# Patient Record
Sex: MALE | Race: WHITE
[De-identification: names, ages, dates, MRNs, and addresses within clinical notes are randomized per-mention and may not be internally consistent; named-entity substitution may affect disease eponyms.]

---

## 2018-11-30 NOTE — NUR
MET WITH PT AND WIFE. PT LIVES WITH WIFE. HE IS INDEPENDENT AND ACTIVE.  USES
[facility].  HAD HH IN PAST BUT CANNOT REMEMBER NAME OF AGENCY.  PT USED TO
SEE DR PRICE BUT NEEDS A NEW PCP, GAVE LIST.  PT DENIES DC NEEDS, WILL
FOLLOW

## 2018-11-30 NOTE — 2DMMODE
[place] [address]
Phone:  [phone] 2 D/M-MODE ECHOCARDIOGRAM     
_______________________________________________________________________________
 
Name:         AVELINOMARGOTHMATT              Room:          [place] [address]    ADM [place] 
[facility]#:    [id number]     Account #:     [id number]  
Admission:    18    Attend Phys:   Heydi Allen, 
Discharge:                Date of Birth: [birth year]  
Date of Service: 18 1744  Report #:      [id number]
        [id number]
_______________________________________________________________________________
THIS REPORT FOR:  //name//                      
 
 
--------------- APPROVED REPORT --------------
 
 
Study performed:  2018 16:59:13
 
EXAM: Comprehensive 2D, Doppler, and color-flow 
Echocardiogram 
Patient Location: In-Patient   
Room #:  [facility]     Status:  routine
 
      BSA:         2.04
HR: 83 bpm BP:          175/78 mmHg 
Rhythm: NSR     
 
Other Information 
Study Quality: Good
 
Indications
Atrial Fibrillation
Bradycardia
 
2D Dimensions
IVSd:  14.15 (7-11mm) LVOT Diam:  19.48 (18-24mm) 
LVDd:  46.21 mm  
PWd:  12.26 (7-11mm) Ascending Ao:  35.99 (22-36mm)
LVDs:  26.86 (25-40mm) 
Aortic Root:  34.14 mm 
 
Volumes
Left Atrial Volume (Systole) 
    LA ESV Index:  23.20 mL/m2
 
Aortic Valve
AoV Peak Bertin.:  2.18 m/s 
AO Peak Gr.:  19.04 mmHg LVOT Max P.24 mmHg
AO Mean Gr.:  10.18 mmHg LVOT Mean P.05 mmHg
    LVOT Max V:  1.03 m/s
AO V2 VTI:  38.14 cm  LVOT Mean V:  0.66 m/s
VERONICA (VTI):  1.28 cm2  LVOT V1 VTI:  16.38 cm
 
Mitral Valve
    E/A Ratio:  2.09
    MV Decel. Time:  258.99 ms
 
 
[place] [address]
Phone:  [phone]                     2 D/M-MODE ECHOCARDIOGRAM     
_______________________________________________________________________________
 
Name:         MATT MONTERROSO              Room:          [address] [place] 
[facility]#:    [id number]     Account #:     [id number]  
Admission:    18    Attend Phys:   Heydi Allen, 
Discharge:                Date of Birth: [birth year]  
Date of Service: 18 1744  Report #:      [id number]
        [id number]
_______________________________________________________________________________
MV E Max Bertin.:  1.21 m/s 
MV PHT:  75.11 ms  
MVA (PHT):  2.93 cm2  
 
TDI
E/Lateral E':  11.00 E/Medial E':  15.13
   Medial E' Bertin.:  0.08 m/s
   Lateral E' Bertin.:  0.11 m/s
 
Pulmonary Valve
PV Peak Bertin.:  0.94 m/s PV Peak Gr.:  3.50 mmHg
 
Left Ventricle
The left ventricle is normal size. There is normal LV segmental wall 
motion. Mild to moderate concentric left ventricular hypertrophy. 
Left ventricular systolic function is normal. LVEF is 60-65%. This 
study is not technically sufficient to allow evaluation of the LV 
diastolic function due to atrial fibrillation.
 
Right Ventricle
The right ventricle is normal size. The right ventricular systolic 
function is normal. Pacemaker lead is present in the right ventricle. 
 
Atria
The left atrium size is normal. The right atrium size is 
normal.
 
Aortic Valve
Mild aortic valve sclerosis. No aortic regurgitation is present. 
Moderate aortic stenosis.
 
Mitral Valve
The mitral valve is normal in structure. Trace mitral regurgitation. 
No evidence of mitral valve stenosis.
 
Tricuspid Valve
The tricuspid valve is normal in structure. Unable to assess PA 
pressure. Trace tricuspid regurgitation.
 
Pulmonic Valve
The pulmonary valve is normal in structure. There is no pulmonic 
valvular regurgitation.
 
Great Vessels
The aortic root is normal in size. IVC is normal in size and 
collapses &gt;50% with inspiration.
 
 
[place] [address]
Phone:  [phone]                     2 D/M-MODE ECHOCARDIOGRAM     
_______________________________________________________________________________
 
Name:         MATT MONTERROSO              Room:          [address] [place] 
[facility]#:    [id number]     Account #:     [id number]  
Admission:    18    Attend Phys:   Heydi Allen, 
Discharge:                Date of Birth: [birth year]  
Date of Service: 18 1744  Report #:      [id number]
        [id number]
_______________________________________________________________________________
 
Pericardium
There is no pericardial effusion.
 
&lt;Conclusion&gt;
The left ventricle is normal size.
Mild to moderate concentric left ventricular hypertrophy.
Left ventricular systolic function is normal.
LVEF is 60-65%.
This study is not technically sufficient to allow evaluation of the 
LV diastolic function due to atrial fibrillation.
Pacemaker lead is present in the right ventricle.
Mild aortic valve sclerosis.
Moderate aortic stenosis.
Trace mitral regurgitation.
IVC is normal in size and collapses &gt;50% with inspiration.
 
 
 
 
 
 
 
 
 
 
 
 
 
 
 
 
 
 
 
 
 
 
 
 
 
 
 
 
  <ELECTRONICALLY SIGNED>
                                           By: Dong Riggins MD, FACC   
  18
D: 18   _____________________________________
T: 18   Dong Riggins MD, FACC     /INF

## 2018-11-30 NOTE — NUR
RECIEVED REPORT AND ASSUMED CARE AT 1900. PULSE BRADYCARDIA, OTHER THAN THAT
VITAL SIGNS STABLE. PT UP WITH ASSIST X 1 TO 2 TO BATHROOM. ON RA. PT DID HAVE
A HEADACHE AND PRN MEDS GIVEN AS ORDERED. ASSESSMENT COMPLETED AND DISCUSSED
PLAN OF CARE WITH PT AND SPOUSE AND THEY UNDERSTAND. BED LOCK, ALARM ON AND
CALL LIGHT WTIHIN REACH. HOURLY ROUNDING DONE AND ALL NEEDS MET. NURSING WILL
CONTINUE TO MONITOR.

## 2018-11-30 NOTE — NUR
ASSESSMENT COMPLETED REFER TO COMPUTER CHARTING. CARDIAC MONITOR TRACKING SB.
PATIENT RESTING IN BED REPORTING NO PAIN, NAUSEA OR SHORTNESS OF BREATH. BED
IN LOW AND LOCKED POSITION. CALL LIGHT WITHIN REACH. WIFE AT BEDSIDE. PATIENT
TO HAVE PACEMAKER THIS AFTERNOON. WILL CONTINUE TO MONITOR THIS SHIFT.

## 2018-12-01 NOTE — NUR
RECEIVED REPORT FROM KRISHNA AND ASSUMED CARE OF PT @ 5763.PT IS A/O X4 BUT
CONFUSED @ TIMES.VSS,TRACING V PACED/AFIB WITH PVC @ TIMES.ASSESSMENT AS
CHARTED.IV PATENT AND SALINE LOCKED.LEFT ARM IN SLING FROM PACEMAKER PLACEMENT
YESTERDAY.INCISION SITE HAS SMALL AMOUNT OF DRAINAGE ON GAUZE DRESSING.PT IS
CALM AND COOPERATIVE WITH  NO C/O PAIN AT TIME OF ASSESSMENT.PT IS UP WITH ONE
ASSIST TO BRP.PT HAS BEEN UP IN CHAIR FOR MEALS.PT LEFT RESTING IN BED WITH
CALL LIGHT AND FALL PRECAUTIONS IN PLACE.WILL CONTINUE TO MONITOR.

## 2018-12-01 NOTE — CON
[address]  [address]                    CONSULTATION                  
_______________________________________________________________________________
 
Name:       MATT MONTERROSO               Room:           [address] [place]  
.R.#:  [id number]      Account #:      [id number]  
Admission:  11/29/18     Attend Phys:    Heydi Allen MD 
Discharge:               Date of Birth:  [birth year]  
         Report #: [id number]
                                                                     [id number]  
_______________________________________________________________________________
THIS REPORT FOR:  //name//                      
 
CC: [facility] physician/PCP
    Heydi Allen
 
TYPE OF REPORT:  Cardiology consultation. 
 
INDICATION:  Paroxysmal atrial fibrillation and sick sinus syndrome.
 
HISTORY OF PRESENT ILLNESS:  The patient is a 78-year-old gentleman with a prior
history of paroxysmal atrial fibrillation.  The patient presented to the
hospital with profound weakness and bradycardia.  He has hypertension that has
been difficult to control and has had some recent adjustments to medications. 
He has required multiple medications for his atrial fibrillation.  Ablation has
been discussed and deferred in the past.  He is not having chest pain.  Holding
some of his medications, his bradycardia has improved.  He continues to have
hypertension.
 
PAST MEDICAL HISTORY:
1.  Paroxysmal atrial fibrillation.
2.  Sick sinus syndrome.
 
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
extremity.
 
FAMILY HISTORY:  Noncontributory.
 
SOCIAL HISTORY:  Lifelong nonsmoker.
 
ALLERGIES:  PENICILLIN.
 
CURRENT MEDICATIONS:  Flomax 0.4 mg p.o. daily, clonidine 0.1 mg p.o. b.i.d.,
diltiazem 300 mg p.o. daily, sotalol 240 mg b.i.d., Zocor 10 mg daily, Benicar
40 mg daily, Eliquis 5 mg b.i.d., digoxin 0.125 mg daily, vitamin B12 1000 mcg
daily, Aller-Chlor 4 mg daily, vitamin C 500 mg daily and Tylenol 500 mg q. 4
hours p.r.n.
 
REVIEW OF SYSTEMS:  
CONSTITUTIONAL:  He denies generalized weakness or focal paralysis.  In general,
there is no unexplained weight loss or fever.
RESPIRATORY:  Without cough, sputum production or underlying lung disease.
CARDIAC:  As outlined above.
ENDOCRINE:  No history of diabetes or thyroid disease.
GASTROINTESTINAL:  No nausea, vomiting, hematemesis, melena, hematochezia,
jaundice or hepatitis.
GENITOURINARY:  No dysuria or hematuria.
 
 
 
[place] [address]                    CONSULTATION                  
_______________________________________________________________________________
 
Name:       MATT MONTERROSO               Room:           [address] [place]  
[facility]#:  [id number]      Account #:      [id number]  
Admission:  11/29/18     Attend Phys:    Heydi Allen MD 
Discharge:               Date of Birth:  [birth year]  
         Report #: [id number]
                                                                     [id number]  
_______________________________________________________________________________
HEMATOLOGIC AND LYMPHATIC:  No history of anemia or bleeding disorder.  He does
have a history of previous cancer removed from the right lower extremity.
PSYCHIATRIC:  No depression or anxiety.
MUSCULOSKELETAL:  He has some arthritis without connective tissue disease.
SKIN:  No recent rashes or chronic skin conditions.
EYES:  He does wear glasses.  He has no acute change in vision.
EARS, NOSE, MOUTH AND THROAT:  He denies decreased hearing or epistaxis.
ALLERGY AND IMMUNOLOGIC:  He has medical allergies as outlined above.
 
PHYSICAL EXAMINATION:
VITAL SIGNS:  Stable.  Blood pressure 161/67, pulse presently in the 50s.  
GENERAL:  This is a pleasant gentleman, in no distress.  Mood and affect
appropriate.
HEENT:  Extraocular muscles intact.  Mucous membranes are moist.
NECK:  Shows no jugular venous distention.  There are no carotid bruits.
CHEST:  Reveals clear lung fields without wheezes, rales or rhonchi.
CARDIOVASCULAR:  Reveals a bradycardic rate with a regular rhythm.  I do not
appreciate gallop or murmur.
ABDOMEN:  Reveals normal bowel sounds.  The abdomen is soft and nontender.
EXTREMITIES:  Shows no edema.  Peripheral pulse 2+ and easily palpable.
SKIN:  Warm and dry.
 
RADIOLOGICAL DATA:  A 12-lead EKG shows sinus bradycardia with paroxysmal atrial
fibrillation.  Echocardiogram pending.
 
IMPRESSION AND RECOMMENDATIONS:
1.  Sick sinus syndrome and paroxysmal atrial fibrillation.  The patient will
benefit from dual-chamber pacemaker in order to facilitate better rhythm control
and medical management of his atrial fibrillation.
2.  Atrial fibrillation.  Continue chronic anticoagulation with Eliquis.
3.  Hypertension.  We will adjust antihypertensive regimen after pacemaker
placed.
 
 
 
 
 
 
 
 
 
 
 
 
<ELECTRONICALLY SIGNED>
                                        By:  Dong Riggins MD, FACC   
12/01/18     0944
D: 11/30/18 1233_______________________________________
T: 11/30/18 2302Mictanya Riggins MD, FACC      /nt

## 2018-12-01 NOTE — NUR
RECIEVED REPORT AND ASSUMED CARE AT 1900. TEMP SLIGHTLY ELEVATED, OTHER THAN
THAT VITAL SIGNS STABLE. PT UP WITH ASSIST X 2 BUT UNSTABLE SO JUST USED
URINAL IN BED. PT HAS PAIN IN LEFT ARM AND PRN PAIN MEDS GIVEN AS ORDERED.
ASSESSMENT COMPLETED AND DISCUSSED PLAN OF CARE AND PT AND WIFE UNDERSTANDS.
PT ON RA. BED LOCKED, BED ALARM ON AND CALL LIGHT WITHIN REACH. HOURLY
ROUNDING DONE AND ALL NEEDS MET. NURSING WILL CONTINUE TO MONITOR.

## 2018-12-01 NOTE — NUR
VSS,CARDIAC MONITORING IN PLACE TRACING AT TIMES V PACED/AV PACED/AFIB WITH
PVCS.PT REMAINS ON ROOM AIR.NO C/O PAIN.IV X2 PATENT AND SALINE LOCKED.PT
PROGRESSING TOWARDS GOALS.PT WORKED WITH PHYSICAL THERAPY.PT INFORMED OF PLAN
OF CARE AND COMMUNICATES UNDERSTANDING.HOURLY ROUNDING COMPLETED FOR PT
SAFETY.CALL LIGHT AND FALL PRECAUTIONS IN PLACE.WILL CONTINUE TO MONITOR FOR
DURATION OF SHIFT.

## 2018-12-02 NOTE — EKG
[place] [address]
Phone:  [phone]                     ELECTROCARDIOGRAM REPORT      
_______________________________________________________________________________
 
Name:       LOCMATT               Room:           [place] [address]    ADM [place]  
.R.#:  [id number]      Account #:      [id number]  
Admission:  18     Attend Phys:    Heydi Allen MD 
Discharge:               Date of Birth:  [birth year]  
         Report #: [id number]
    [id number]
_______________________________________________________________________________
THIS REPORT FOR:  //name//                      
 
                          [facility]
                                       
Test Date:    2018               Test Time:    08:01:28
Pat Name:     MATT MONTERROSO         Department:   
Patient ID:   [id number]            Room:         [place] [address]
Gender:       M                        Technician:   KATELIN
:          1940               Requested By: Dong Riggins
Order Number: [id number]    Pat MD:   Dong Riggins
                                 Measurements
Intervals                              Axis          
Rate:         78                       P:            0
TX:           67                       QRS:          -58
QRSD:         137                      T:            -22
QT:           409                                    
QTc:          466                                    
                           Interpretive Statements
Ventricular-paced complexes
No further rhythm analysis attempted due to paced rhythm
RBBB and LAFB
No previous ECG available for comparison
 
Electronically Signed On 2018 15:34:32 CST by Dong Riggins
[email or web address]
 
 
 
 
 
 
 
 
 
 
 
 
 
 
 
 
 
 
  <ELECTRONICALLY SIGNED>
                                           By: Dong Riggins MD, [facility]   
  18     1534
D: 18 0801   _____________________________________
T: 18 0801   Dong Riggins MD, [facility]     /EPI

## 2018-12-02 NOTE — NUR
RECEIVED REPORT FROM KRISHNA AND ASSUMED CARE OF PT @ 5846.PT IS A/O
X4,VSS,TRACING SR WITH BBB ON THE MONITOR.ASSESSMENT AS CHARTED.IV X2 PATNET
AND SALINE LOCKED.PT CALM AND COOPERATIVE WITH NO C/O PAIN AT TIME OF
ASSESSMENT.PT IS UP WITH ONE ASSIST TO BATHROOM.PT LEFT RESTING IN RECLINER
WITH WIFE AT BEDSIDE.CALL LIGHT AND FALL PRECAUTIONS IN PLACE.WILL CONTINUE TO
MONITOR.

## 2018-12-02 NOTE — NUR
VSS,CARDIAC MONITORING IN PLACE WITH NO CHANGES.PT REMAINS ON ROOM AIR.PT
PROGRESSING TOWARDS GOALS.NO C/O PAIN.IV PATENT AND SALINE LOCKED.PT INFORMED
OF PLAN OF CARE AND COMMUNICATES UNDERSTANDING.HOURLY ROUNDING COMPLETED FOR
PT SAFETY.CALL LIGHT AND FALL PRECAUTIONS IN PLACE. WILL CONTINUE TO MONITOR
FOR DURATION OF SHIFT.

## 2018-12-02 NOTE — EKG
[place] [address]
Phone:  [phone]                     ELECTROCARDIOGRAM REPORT      
_______________________________________________________________________________
 
Name:       AVELINOMATT JUSTIN               Room:           [place] [address]    ADM [place]  
[facility].#:  [id number]      Account #:      [id number]  
Admission:  18     Attend Phys:    Heydi Allen MD 
Discharge:               Date of Birth:  [birth year]  
         Report #: [id number]
    [id number]
_______________________________________________________________________________
THIS REPORT FOR:  //name//                      
 
                         [facility] ED
                                       
Test Date:    2018               Test Time:    15:54:51
Pat Name:     MATT MONTERROSO         Department:   
Patient ID:   [id number]            Room:         [facility]
Gender:       M                        Technician:   JUDY MATHEW
:          1940               Requested By: Maria Esther Ballard
Order Number: [id number] MD:   Dong Riggins
                                 Measurements
Intervals                              Axis          
Rate:         40                       P:            -24
[place]:           180                      QRS:          92
QRSD:         145                      T:            -60
QT:           600                                    
QTc:          490                                    
                           Interpretive Statements
Sinus bradycardia
RBBB and LPFB
Repol abnrm suggests ischemia, diffuse leads
No previous ECG available for comparison
 
Electronically Signed On 2018 15:26:31 CST by Dong Riggins
[email or web address]
 
 
 
 
 
 
 
 
 
 
 
 
 
 
 
 
 
 
  <ELECTRONICALLY SIGNED>
                                           By: Dong Riggins MD, [facility]   
  18     1526
D: 18 1554   _____________________________________
T: 18 1554   Dong Riggins MD, [facility]     /EPI

## 2018-12-02 NOTE — NUR
RECIEVED REPORT AND ASSUMED CARE AT 1900. VITAL SIGNS STABLE. PT UP WITH
ASSIST X 1 TO 2. ASSESSMENT COMPLETED AND DISCUSSED PLAN OF CARE AND PT AND
WIFE UNDERSTANDS. PT ON RA. PT HAS SOME PAIN IN LEFT ARM AND HEADACHE, PRN
PAIN MEDS GIVEN AS ORDERED. BED LOCKED, ALARM ON AND CALL LIGHT WITHIN REACH.
HOURLY ROUNDING DONE AND ALL NEEDS MET. NURSING WILL CONTINUE TO MONITOR.

## 2018-12-03 NOTE — NUR
PT ALERT ORIETNED. UP WITH ASSIST OF ONE. L CHEST WITH STERI STRIPS. AREA
PUFFY. TELEMETERY SHOWS SR BBB 1ST DEGEE AVB. DENIES PAIN. PT REQUEST STOOL
SOFTENER. DR ORDERED MAG CITRATE LAST NOC AND MIRALAX IN THE AM. PT DECLINED
TAKING THE MAG CITRATE BECAUSE HE WANTED TO GET GOOD NIGHTS SLEEP. PT AGREED
TO TAKE MIRALAX IN AM.

## 2018-12-03 NOTE — NUR
CONTINUE TO FOLLOW, MET WITH PT AND WIFE. PT NOW STATING HE PREFERS TO GO
HOME, STATES HE DOESN'T WANT TO STAY IN THE HOSPITAL ANY LONGER AND PREFERS TO
DO OUTPT [place].  OFFERED HH AND HE DECLINES THAT ALSO.  PT HAD CONSULT FOR INPT
REHAB, BUT STATES HE DIDN'T UNDERSTAND THAT WAS 'INPT'.  PT AGREEABLE TO HAVE
CM ASSIST WITH MAKING NEW PCP APPT, DISCUSSED OPTIONS FOR PCP AGAIN AND HE
CHOSE DR BOYD.  APPT MADE FOR MONDAY 12/10, PT ALSO HAS APPT FOR SITE CHECK
WITH NURSE AT [facility] OFFICE, TIME ADJUSTED SO HE CAN SEE DR BOYD FIRST.  WIFE
GIVEN CARDS FOR [facility] APPT F/U AND FORM TO FILL OUT.  CALLED AND FAXED CLINICAL
TO DR BOYD FOR F/U.  DISCUSSED WTIH DR CAMPOS THAT PT WANTS TO GO TO OUTPT
THERAPY.  NO OTHER NEEDS ID'D

## 2018-12-04 NOTE — CARD
[address]  [address]                    CARDIAC CATH REPORT           
_______________________________________________________________________________
 
Name:       MATT MONTERROSO               Room:           [address] [place]  
[facility]#:  [id number]      Account #:      [id number]  
Admission:  11/29/18     Attend Phys:    Heydi Allen MD 
Discharge:  12/03/18     Date of Birth:  [birth year]  
         Report #: [id number]
                                                                     [id number]
_______________________________________________________________________________
THIS REPORT FOR:  //name//                      
 
 
--------------- ADDENDUM APPROVED REPORT --------------
 
 
Study performed:  11/30/2018 14:46:35
 
Patient Status: In-Patient       Room #: 233
Event Personnel: Dong Riggins Cardiologist, Dona Luo RN 
Circulator, Tierra Barr RN RN, Rafia Squires RTR Monitor, 
Vonda Medina RCIS Scrub
Exam: Insertion of Dual Chamber Permanent Pacemaker
 
The patient is a 78 year-old male with a history of .
 
Conscious Sedation
Start time:  15:58           End Time:  16:25    
Fentanyl  100 mcg    Versed  2 mg  
 
Implanted Devices:  Biotronik Eluna 8 DR-T, serial 
#[id number].
Biotronik solely S 60, serial number [id number] ventricular 
lead.
Biotronik solely S 53, serial #[id number] atrial lead.
 
Procedure
The patient underwent informed consent. We discussed the details of 
the procedure including the risks, which include, but not limited to 
bleeding, infection, vascular damage, cardiac perforation, and 
pneumothorax. 
After informed consent was obtained the area of the left chest was 
prepped and draped in sterile fashion. Local anesthesia was achieved 
with 1% lidocaine. Next after an initial incision was made a device 
pocket was formed over the left pectoralis muscle using 
electrocautery and blunt dissection. The left subclavian vein was 
then accessed with a micropuncture kit utilizing a peripheral 
injection of IV contrast. Ultimately a safety J guidewire was 
advanced to the level of the right atrium under fluoroscopic 
guidance. The guidewire was fixed external using a Melonie forcep. The 
micropuncture kit was utilized a second time to access the left 
subclavian vein. A second safety a guidewire was advanced to the area 
of the right atrium under fluoroscopic guidance. A 7 Japanese tear-away 
introducer was advanced over the free guidewire. The dilator and 
guidewire were removed as a ventricular lead was advanced to a secure 
position within the right ventricular apex under fluoroscopic 
 
 
 
[place] [address]                    CARDIAC CATH REPORT           
_______________________________________________________________________________
 
Name:       MATT MONTERROSO               Room:           M.233-1    DIS IN  
M.R.#:  [id number]      Account #:      [id number]  
Admission:  11/29/18     Attend Phys:    Heydi Allen MD 
Discharge:  12/03/18     Date of Birth:  [birth year]  
         Report #: [id number]
                                                                     [id number]
_______________________________________________________________________________
guidance. The tear-away introducer was removed. The lead was actively 
fixed. Thresholds were checked and deemed to be satisfactory. There 
was no diaphragmatic stimulation with maximum output pacing. A 7 
Japanese tear-away introducer was advanced over the remaining 
guidewire. The dilator and guidewire were removed and an atrial lead 
advanced to a secure position within the right atrial appendage. The 
tear-away introducer was removed. The lead was actively fixed. 
Thresholds were checked and deemed to be satisfactory. There was no 
phrenic nerve stimulation with maximum output stimulation. After 
adequate slack was assured and the atrial and ventricular leads the 
leads were secured within the device pocket using the designated cuff 
and interrupted stitches of 0 silk suture. The pocket was then 
flushed with antibody solution. A dual-chamber pulse generator was 
attached to the atrial and ventricular lead. The generator and 
redundant lead were then placed within the device pocket. The deep 
tissues were closed utilizing interrupted stitches of 2-0 Vicryl. The 
skin incision was then closed with a single subcuticular stitch of 
4-0 Vicryl. Several Steri-Strips were placed across the incision. A 
sterile Telfa dressing was then covered with a Tegaderm. The patient 
tolerated the procedure well without complication.
 
Electrode Parameters
P Wave:  2.8 mV     R Wave: 14.1 mV     
Atrial Threshold:  atrial fibrillation  Ventricular Threshold:  0.9 V 
at 0.40 ms 
Atrial Resistance:  464    Ventricular Resistance:  890 ohms  
Mode: DDD/CLS.
Lower rate 60 bpm.
Upper tracking rate 130 bpm.       
 
Complications
The patient tolerated the procedure well and there were no 
complications associated with the procedure. 
 
Findings
1. Symptomatic bradycardia.
2. Successful placement of a dual-chamber pacemaker.
 
Conclusion
1. Follow up in 1 week for a site check.
2. Follow-up device check in the office in one to 2 months.
 
 
 
<ELECTRONICALLY SIGNED>
                                        By:  Dong Riggins MD, FACC   
12/04/18     1709
D: 12/04/18 1709_______________________________________
T: 12/04/18 1709Mictanya Riggins MD, FACC      /INF

## 2019-01-07 NOTE — EKG
[place] [address]
Phone:  [phone]                     ELECTROCARDIOGRAM REPORT      
_______________________________________________________________________________
 
Name:       BARBARAMERARYMATT JUSTIN               Room:                      [facility]#:  [id number]      Account #:      [id number]  
Admission:  19     Attend Phys:                         
Discharge:  19     Date of Birth:  [birth year]  
         Report #: [id number]
    [id number]
_______________________________________________________________________________
THIS REPORT FOR:  //name//                      
 
                         [facility] ED
                                       
Test Date:    2019               Test Time:    10:43:26
Pat Name:     MATT MONTERROSO         Department:   
Patient ID:   [id number]            Room:          
Gender:       M                        Technician:   SORAIDA
:          1940               Requested By: Elana Le
Order Number: [id number] MD:   Thiago Lazo
                                 Measurements
Intervals                              Axis          
Rate:         66                       P:            
DE:           133                      QRS:          -62
QRSD:         141                      T:            -30
QT:           520                                    
QTc:          545                                    
                           Interpretive Statements
Atrial-paced rhythm
RBBB and LAFB
Compared to ECG 2018 08:01:28
No significant changes
 
Electronically Signed On 2019 16:32:44 CST by Thiago Lazo
[email or web address]
 
 
 
 
 
 
 
 
 
 
 
 
 
 
 
 
 
 
  <ELECTRONICALLY SIGNED>
                                           By: Thiago Lazo MD, [facility]     
  19     1632
D: 19 1043   _____________________________________
T: 19   Thiago Lazo MD, FAC       /EPI

## 2019-02-05 NOTE — NUR
PATIENT WAS SEEN THIS MORNING FOR MYELOGRAM.  CALLED TO RADIOLOGY TO PERFORM
VITAL SIGNS PRIOR TO PROCEDURE.  PATIENT HAD NOT RECEIVED HIS BLOOD PRESSURE
MEDICATION THIS MORNING AS NO INSTRUCTIONS HAD BEEN GIVEN TO HIM SO HE CHOSE
NOT TO TAKE IT.  PATIENT BP /136.  DR. RODRIGUEZ NOTIFIED AND PATIENT
RECEIVED 0.1MG CLONIDINE  SOTALOL.  THESE WERE HIS REGULAR MEDICATIONS
AS ORDERED BY HIS PHYSICIAN AT HOME PER HIS MEDICATION LIST. PATIENT STATED
HIS CARDIOLOGIST WAS DR. BUSTOS WHO IS ON VACATION THIS WEEK.  INFORMED DR. KUMAR AND DR. RUIZ OF PATIENT AND HIS VITAL SIGNS AND INTERVENTION DONE
FOR FUTURE REFERENCE IF NEEDED.
.

## 2019-10-09 ENCOUNTER — HOSPITAL ENCOUNTER (OUTPATIENT)
Dept: HOSPITAL 96 - M.CRD | Age: 79
End: 2019-10-09
Attending: NURSE PRACTITIONER
Payer: COMMERCIAL

## 2019-10-09 DIAGNOSIS — I50.42: ICD-10-CM

## 2019-10-09 DIAGNOSIS — I07.1: Primary | ICD-10-CM

## 2019-10-09 DIAGNOSIS — R60.0: ICD-10-CM

## 2019-10-09 DIAGNOSIS — Z95.0: ICD-10-CM

## 2019-10-09 NOTE — 2DMMODE
[place] [address]
Phone:  [phone] 2 D/M-MODE ECHOCARDIOGRAM     
_______________________________________________________________________________
 
Name:         MATT MONTERROSO          Room:                     REG CLI
M.R.#:    [id number]     Account #:     [id number]  
Admission:    10/09/19    Attend Phys:   Marie Paul 
Discharge:                Date of Birth: [birth year]  
Date of Service: 10/09/19 1437  Report #:      [id number]
        [id number]
_______________________________________________________________________________
THIS REPORT FOR:  //name//                      
 
 
--------------- APPROVED REPORT --------------
 
 
Study performed:  10/09/2019 13:35:54
 
EXAM: Comprehensive 2D, Doppler, and color-flow 
Echocardiogram 
Patient Location: Out-Patient   
 
      BSA:         2.07
HR: 85 bpm BP:          130/70 mmHg 
 
Other Information 
Study Quality: Good
 
Indications
Congestive Heart Failure
 
2D Dimensions
IVSd:  12.43 (7-11mm) LVOT Diam:  20.26 (18-24mm) 
LVDd:  51.99 mm  
PWd:  9.68 (7-11mm) Ascending Ao:  33.28 (22-36mm)
LVDs:  37.78 (25-40mm) 
Aortic Root:  28.54 mm 
 
Volumes
Left Atrial Volume (Systole) 
    LA ESV Index:  22.10 mL/m2
 
Aortic Valve
AoV Peak Bertin.:  2.13 m/s 
AO Peak Gr.:  18.09 mmHg LVOT Max PG:  3.13 mmHg
AO Mean Gr.:  10.76 mmHg LVOT Mean P.59 mmHg
    LVOT Max V:  0.88 m/s
AO V2 VTI:  48.26 cm  LVOT Mean V:  0.58 m/s
VERONICA (VTI):  1.27 cm2  LVOT V1 VTI:  18.96 cm
 
Mitral Valve
    E/A Ratio:  0.83
    MV Decel. Time:  182.33 ms
MV E Max Bertin.:  1.06 m/s 
MV PHT:  52.88 ms  
MVA (PHT):  4.16 cm2  
 
 
[place] [address]
Phone:  [phone]                     2 D/M-MODE ECHOCARDIOGRAM     
_______________________________________________________________________________
 
Name:         MATT MONTERROSO          Room:                     REG CLI
M.R.#:    [id number]     Account #:     [id number]  
Admission:    10/09/19    Attend Phys:   Marie Paul 
Discharge:                Date of Birth: [birth year]  
Date of Service: 10/09/19 1437  Report #:      [id number]
        [id number]
_______________________________________________________________________________
 
TDI
E/Lateral E':  13.25 E/Medial E':  13.25
   Medial E' Bertin.:  0.08 m/s
   Lateral E' Bertin.:  0.08 m/s
 
Pulmonary Valve
PV Peak Bertin.:  0.88 m/s PV Peak Gr.:  3.08 mmHg
 
Tricuspid Valve
    RAP Estimate:  5.00 mmHg
TR Peak Gr.:  18.72 mmHg RVSP:  23.72 mmHg
    PA Pressure:  23.72 mmHg
 
Left Ventricle
The left ventricle is normal size. There is normal LV segmental wall 
motion. There is normal left ventricular wall thickness. Left 
ventricular systolic function is normal. The left ventricular 
ejection fraction is within the normal range. LVEF is 55%. Grade I - 
abnormal relaxation pattern.
 
Right Ventricle
The right ventricle is normal size. The right ventricular systolic 
function is normal.
 
Atria
The left atrium size is normal. Pacemaker lead is present in the 
right atrium.
 
Aortic Valve
Mild aortic valve sclerosis. No aortic regurgitation is present. No 
hemodynamically significant valvular aortic stenosis.
 
Mitral Valve
The mitral valve is normal in structure. There is no mitral valve 
regurgitation noted. No evidence of mitral valve stenosis.
 
Tricuspid Valve
The tricuspid valve is normal in structure. Mild tricuspid 
regurgitation.
 
Pulmonic Valve
The pulmonary valve is normal in structure. There is no pulmonic 
valvular regurgitation.
 
[facility]
 
 
[place] [address]
Phone:  [phone] 2 D/M-MODE ECHOCARDIOGRAM     
_______________________________________________________________________________
 
Name:         LOCMATTNAUN RODRIGUEZ          Room:                     REG CLI
M.R.#:    [id number]     Account #:     [id number]  
Admission:    10/09/19    Attend Phys:   Marie Paul 
Discharge:                Date of Birth: [birth year]  
Date of Service: 10/09/19 1437  Report #:      [id number]
        [id number]
_______________________________________________________________________________
The aortic root is normal in size. IVC is normal in size and 
collapses >50% with inspiration.
 
Pericardium
There is no pericardial effusion.
 
<Conclusion>
The left ventricle is normal size.
There is normal left ventricular wall thickness.
Left ventricular systolic function is normal.
The left ventricular ejection fraction is within the normal 
range.
LVEF is 55%.
Grade I - abnormal relaxation pattern.
The right ventricle is normal size.
The left atrium size is normal.
Mild aortic valve sclerosis.
No aortic regurgitation is present.
No hemodynamically significant valvular aortic stenosis.
The mitral valve is normal in structure.
The tricuspid valve is normal in structure.
IVC is normal in size and collapses >50% with inspiration.
There is no pericardial effusion.
There is normal LV segmental wall motion.
 
 
 
 
 
 
 
 
 
 
 
 
 
 
 
 
 
 
 
 
  <ELECTRONICALLY SIGNED>
                                           By: Thiago Lazo MD, FACC     
  10/09/19     1437
D: 10/09/19 1437   _____________________________________
T: 10/09/19 1437   Thiago Lazo MD, FACC       /INF

## 2020-01-29 ENCOUNTER — HOSPITAL ENCOUNTER (OUTPATIENT)
Dept: HOSPITAL 96 - M.RAD | Age: 80
End: 2020-01-29
Attending: NURSE PRACTITIONER
Payer: COMMERCIAL

## 2020-01-29 DIAGNOSIS — Z79.899: Primary | ICD-10-CM

## 2020-02-01 ENCOUNTER — HOSPITAL ENCOUNTER (INPATIENT)
Dept: HOSPITAL 96 - M.ERS | Age: 80
LOS: 3 days | Discharge: HOME | DRG: 246 | End: 2020-02-04
Attending: FAMILY MEDICINE | Admitting: FAMILY MEDICINE
Payer: MEDICARE

## 2020-02-01 VITALS — DIASTOLIC BLOOD PRESSURE: 58 MMHG | SYSTOLIC BLOOD PRESSURE: 134 MMHG

## 2020-02-01 VITALS — SYSTOLIC BLOOD PRESSURE: 136 MMHG | DIASTOLIC BLOOD PRESSURE: 64 MMHG

## 2020-02-01 VITALS — DIASTOLIC BLOOD PRESSURE: 71 MMHG | SYSTOLIC BLOOD PRESSURE: 141 MMHG

## 2020-02-01 VITALS — WEIGHT: 224 LBS | HEIGHT: 69.02 IN | BODY MASS INDEX: 33.18 KG/M2

## 2020-02-01 DIAGNOSIS — E87.6: ICD-10-CM

## 2020-02-01 DIAGNOSIS — N18.3: ICD-10-CM

## 2020-02-01 DIAGNOSIS — I21.4: Primary | ICD-10-CM

## 2020-02-01 DIAGNOSIS — D68.59: ICD-10-CM

## 2020-02-01 DIAGNOSIS — I48.0: ICD-10-CM

## 2020-02-01 DIAGNOSIS — J44.9: ICD-10-CM

## 2020-02-01 DIAGNOSIS — I12.9: ICD-10-CM

## 2020-02-01 DIAGNOSIS — N17.0: ICD-10-CM

## 2020-02-01 DIAGNOSIS — I42.8: ICD-10-CM

## 2020-02-01 DIAGNOSIS — Z98.2: ICD-10-CM

## 2020-02-01 DIAGNOSIS — Z88.0: ICD-10-CM

## 2020-02-01 DIAGNOSIS — Z79.82: ICD-10-CM

## 2020-02-01 DIAGNOSIS — Z79.899: ICD-10-CM

## 2020-02-01 DIAGNOSIS — Z79.01: ICD-10-CM

## 2020-02-01 DIAGNOSIS — Z85.89: ICD-10-CM

## 2020-02-01 DIAGNOSIS — Z95.0: ICD-10-CM

## 2020-02-01 DIAGNOSIS — R56.9: ICD-10-CM

## 2020-02-01 LAB
ABSOLUTE BASOPHILS: 0.1 THOU/UL (ref 0–0.2)
ABSOLUTE EOSINOPHILS: 0.4 THOU/UL (ref 0–0.7)
ABSOLUTE MONOCYTES: 0.6 THOU/UL (ref 0–1.2)
ALBUMIN SERPL-MCNC: 3.5 G/DL (ref 3.4–5)
ALP SERPL-CCNC: 78 U/L (ref 46–116)
ALT SERPL-CCNC: 37 U/L (ref 30–65)
ANION GAP SERPL CALC-SCNC: 23 MMOL/L (ref 7–16)
AST SERPL-CCNC: 22 U/L (ref 15–37)
BASOPHILS NFR BLD AUTO: 0.7 %
BILIRUB SERPL-MCNC: 0.4 MG/DL
BUN SERPL-MCNC: 9 MG/DL (ref 7–18)
CALCIUM SERPL-MCNC: 8.6 MG/DL (ref 8.5–10.1)
CHLORIDE SERPL-SCNC: 98 MMOL/L (ref 98–107)
CHOLEST SERPL-MCNC: 131 MG/DL (ref ?–200)
CO2 SERPL-SCNC: 17 MMOL/L (ref 21–32)
CREAT SERPL-MCNC: 1.7 MG/DL (ref 0.6–1.3)
EOSINOPHIL NFR BLD: 4.4 %
GLUCOSE SERPL-MCNC: 172 MG/DL (ref 70–99)
GRANULOCYTES NFR BLD MANUAL: 62.7 %
HCT VFR BLD CALC: 40.7 % (ref 42–52)
HDLC SERPL-MCNC: 33 MG/DL (ref 40–?)
HGB BLD-MCNC: 14.1 GM/DL (ref 14–18)
LDLC SERPL-MCNC: 83 MG/DL (ref ?–100)
LYMPHOCYTES # BLD: 2.1 THOU/UL (ref 0.8–5.3)
LYMPHOCYTES NFR BLD AUTO: 25.2 %
MAGNESIUM SERPL-MCNC: 1.7 MG/DL (ref 1.8–2.4)
MCH RBC QN AUTO: 32.1 PG (ref 26–34)
MCHC RBC AUTO-ENTMCNC: 34.6 G/DL (ref 28–37)
MCV RBC: 92.7 FL (ref 80–100)
MONOCYTES NFR BLD: 7 %
MPV: 10 FL. (ref 7.2–11.1)
NEUTROPHILS # BLD: 5.1 THOU/UL (ref 1.6–8.1)
NUCLEATED RBCS: 0 /100WBC
PLATELET COUNT*: 388 THOU/UL (ref 150–400)
POTASSIUM SERPL-SCNC: 3.1 MMOL/L (ref 3.5–5.1)
PROT SERPL-MCNC: 7.8 G/DL (ref 6.4–8.2)
RBC # BLD AUTO: 4.39 MIL/UL (ref 4.5–6)
RDW-CV: 13.6 % (ref 10.5–14.5)
SODIUM SERPL-SCNC: 138 MMOL/L (ref 136–145)
TC:HDL: 4 RATIO
TRIGL SERPL-MCNC: 78 MG/DL (ref ?–150)
VLDLC SERPL CALC-MCNC: 16 MG/DL (ref ?–40)
WBC # BLD AUTO: 8.2 THOU/UL (ref 4–11)

## 2020-02-01 NOTE — NUR
PT ARRIVED TO UNIT AT APPROX 1540 VIA CART, WIFE AT SIDE, REPORT TAKEN FROM
JENNIFER MORRIS. PT A&O X4, VSS, CARDIAC MONITOR TRACING A PACED, DENIES ANY PAIN
OR DIZZINESS AT THIS TIME, FULL ASSESSMENT AS CHARTED. PTS TROPONIN CAME IN
CRITICAL AT 3.55, DR TEAGUE NOTIFIED, CARDIOLOGY CONSULT ORDERED, PT
ASYMPTOMATIC. PT ORIENTED TO CALL LIGHT AND ROOM, HOURLY ROUNDING COMPLETED.

## 2020-02-01 NOTE — EEG
[address]  [address]                    EEG STUDY REPORT              
_______________________________________________________________________________
 
Name:       MATT MONTERROSO           Room:           [address] [place]  
M.R.#:  [id number]      Account #:      [id number]  
Admission:  02/01/20     Attend Phys:    Mary Michael
Discharge:               Date of Birth:  [birth year]  
         Report #: [id number]
                                                                     [id number]  
_______________________________________________________________________________
THIS REPORT FOR:  //name//                      
 
CC: Johnnie Spear
 
DATE OF SERVICE:  02/03/2020
 
 
This patient's EEG was done to evaluate for the possibility of seizure.  EEG was
done by placing the electrode by standard 10-20 system of electrode placement. 
Both referential and sequential montages were used for recording.  Background
activity in this patient's EEG is about 11 Hz and 30 microvolts.  There is a
symmetrical activity.  The patient went to sleep that is associated with
bilateral slowing and vertex sharp waves.  Photic stimulation was unremarkable. 
Throughout the record, no active epileptiform activity was noticed.
 
IMPRESSION:  This patient's EEG is within normal limits.
 
Thank you very much for this referral.
 
 
 
 
 
 
 
 
 
 
 
 
 
 
 
 
 
 
 
 
 
 
 
 
                       
                                        By:                                
                 
D: 02/03/20 1604_______________________________________
T: 02/03/20 1812Ppedro pablo Lopez MD            /nt

## 2020-02-01 NOTE — EKG
[place] [address]
Phone:  [phone]                     ELECTROCARDIOGRAM REPORT      
_______________________________________________________________________________
 
Name:         MATT MONTERROSO          Room:          [address] [place] 
..#:    [id number]     Account #:     [id number]  
Admission:    20    Attend Phys:   Mary cook Sa
Discharge:                Date of Birth: [birth year]  
Date of Service: 20 0956  Report #:      [id number]
        [id number]
_______________________________________________________________________________
THIS REPORT FOR:
 
cc:  Johnnie García MD, David L. MD Epiphany,Rosibel JOSEPH          
                                                                       ~
THIS REPORT FOR:  //name//                      
 
                          [facility]
                                       
Test Date:    2020               Test Time:    09:56:20
Pat Name:     MATT MONTERROSO         Department:   
Patient ID:   [id number]            Room:         [address]
Gender:       M                        Technician:   
:          1940               Requested By: Johnnie Bojorquez
Order Number: [id number]    Reading MD:     
                                 Measurements
Intervals                              Axis          
Rate:         71                       P:            
MT:           157                      QRS:          -62
QRSD:         148                      T:            -9
QT:           454                                    
QTc:          494                                    
                           Interpretive Statements
Atrial-paced complexes
RBBB and LAFB
Compared to ECG 2020 09:49:12
Sinus tachycardia no longer present
[email or web address]
 
 
 
 
 
 
 
 
 
 
 
 
 
 
 
                         
                                           By:                               
                   
D: 2056   _____________________________________
T: 20   Epiphany Epiphany, MD           /SONIA

## 2020-02-01 NOTE — EKG
[place] [address]
Phone:  [phone]                     ELECTROCARDIOGRAM REPORT      
_______________________________________________________________________________
 
Name:         MATT MONTERROSO MICHAEL          Room:                     REG ER 
M.R.#:    [id number]     Account #:     [id number]  
Admission:    20    Attend Phys:                     
Discharge:                Date of Birth: [birth year]  
Date of Service: 20 0949  Report #:      [id number]
        [id number]
_______________________________________________________________________________
THIS REPORT FOR:
 
cc:  Johnnie García MD, David L. MD Epiphany, Epiphany MD          
                                                                       ~
THIS REPORT FOR:  //name//                      
 
                         [facility] ED
                                       
Test Date:    2020               Test Time:    09:49:12
Pat Name:     MATT MONTERROSO         Department:   
Patient ID:   [id number]            Room:          
Gender:       M                        Technician:   [facility]
:          1940               Requested By: Yair Johnson
Order Number: [id number] MD:     
                                 Measurements
Intervals                              Axis          
Rate:         100                      P:            75
MI:           176                      QRS:          -85
QRSD:         156                      T:            48
QT:           372                                    
QTc:          480                                    
                           Interpretive Statements
Sinus tachycardia
RBBB and LAFB
Borderline ST depression, lateral leads
Compared to ECG 2019 10:43:26
ST (T wave) deviation now present
Atrial-paced complex(es) or rhythm no longer present
Ventricular-paced complex(es) or rhythm no longer present
[email or web address]
 
 
 
 
 
 
 
 
 
 
 
 
                         
                                           By:                               
                   
D: 20   _____________________________________
T: 20   Epiphany MD Rosibel           /SONIA

## 2020-02-02 VITALS — DIASTOLIC BLOOD PRESSURE: 72 MMHG | SYSTOLIC BLOOD PRESSURE: 140 MMHG

## 2020-02-02 VITALS — SYSTOLIC BLOOD PRESSURE: 138 MMHG | DIASTOLIC BLOOD PRESSURE: 72 MMHG

## 2020-02-02 VITALS — SYSTOLIC BLOOD PRESSURE: 116 MMHG | DIASTOLIC BLOOD PRESSURE: 56 MMHG

## 2020-02-02 VITALS — DIASTOLIC BLOOD PRESSURE: 52 MMHG | SYSTOLIC BLOOD PRESSURE: 127 MMHG

## 2020-02-02 VITALS — DIASTOLIC BLOOD PRESSURE: 74 MMHG | SYSTOLIC BLOOD PRESSURE: 123 MMHG

## 2020-02-02 VITALS — SYSTOLIC BLOOD PRESSURE: 127 MMHG | DIASTOLIC BLOOD PRESSURE: 76 MMHG

## 2020-02-02 LAB
ANION GAP SERPL CALC-SCNC: 10 MMOL/L (ref 7–16)
BUN SERPL-MCNC: 7 MG/DL (ref 7–18)
CALCIUM SERPL-MCNC: 8.2 MG/DL (ref 8.5–10.1)
CHLORIDE SERPL-SCNC: 104 MMOL/L (ref 98–107)
CO2 SERPL-SCNC: 26 MMOL/L (ref 21–32)
CREAT SERPL-MCNC: 1.1 MG/DL (ref 0.6–1.3)
GLUCOSE SERPL-MCNC: 103 MG/DL (ref 70–99)
HCT VFR BLD CALC: 37.4 % (ref 42–52)
HGB BLD-MCNC: 13.1 GM/DL (ref 14–18)
MCH RBC QN AUTO: 31.9 PG (ref 26–34)
MCHC RBC AUTO-ENTMCNC: 35.1 G/DL (ref 28–37)
MCV RBC: 90.9 FL (ref 80–100)
MPV: 9.1 FL. (ref 7.2–11.1)
PLATELET COUNT*: 316 THOU/UL (ref 150–400)
POTASSIUM SERPL-SCNC: 3.3 MMOL/L (ref 3.5–5.1)
RBC # BLD AUTO: 4.12 MIL/UL (ref 4.5–6)
RDW-CV: 14 % (ref 10.5–14.5)
SODIUM SERPL-SCNC: 140 MMOL/L (ref 136–145)
WBC # BLD AUTO: 6.8 THOU/UL (ref 4–11)

## 2020-02-02 NOTE — NUR
ASSUMED CARE OF PT AFTER REFPORT AT 1930. PT A&O4. VSS. PHYSICAL ASSESSMENT
COMPLETED AND CHARTED. PT ON RA. PT TRACING APACED ON TELE. PT UPSTANDBY. PT
REQUESTING FOR HEARTBURN MEDICATION AND SLEEPING PILL- DR TEAGUE MADE
AWARE WITH NEW ORDERS. PT POTASSIUM 3.1- ELECTROLYTE PROTOCOL IN PLACE.
INSTRUCTED ON NPO POST MIDNIGHT FOR CARDIO CONSULT. COMMUNICATES
UNDERSTANDING. SEIZURE PRECAUTIONS IN PLACE. CALL LIGHT WITHIN REACH.

## 2020-02-03 VITALS — DIASTOLIC BLOOD PRESSURE: 71 MMHG | SYSTOLIC BLOOD PRESSURE: 126 MMHG

## 2020-02-03 VITALS — SYSTOLIC BLOOD PRESSURE: 121 MMHG | DIASTOLIC BLOOD PRESSURE: 73 MMHG

## 2020-02-03 VITALS — DIASTOLIC BLOOD PRESSURE: 65 MMHG | SYSTOLIC BLOOD PRESSURE: 125 MMHG

## 2020-02-03 VITALS — SYSTOLIC BLOOD PRESSURE: 132 MMHG | DIASTOLIC BLOOD PRESSURE: 64 MMHG

## 2020-02-03 VITALS — SYSTOLIC BLOOD PRESSURE: 110 MMHG | DIASTOLIC BLOOD PRESSURE: 65 MMHG

## 2020-02-03 VITALS — SYSTOLIC BLOOD PRESSURE: 127 MMHG | DIASTOLIC BLOOD PRESSURE: 71 MMHG

## 2020-02-03 VITALS — DIASTOLIC BLOOD PRESSURE: 75 MMHG | SYSTOLIC BLOOD PRESSURE: 137 MMHG

## 2020-02-03 VITALS — DIASTOLIC BLOOD PRESSURE: 91 MMHG | SYSTOLIC BLOOD PRESSURE: 130 MMHG

## 2020-02-03 VITALS — SYSTOLIC BLOOD PRESSURE: 128 MMHG | DIASTOLIC BLOOD PRESSURE: 70 MMHG

## 2020-02-03 VITALS — DIASTOLIC BLOOD PRESSURE: 85 MMHG | SYSTOLIC BLOOD PRESSURE: 147 MMHG

## 2020-02-03 VITALS — SYSTOLIC BLOOD PRESSURE: 113 MMHG | DIASTOLIC BLOOD PRESSURE: 70 MMHG

## 2020-02-03 VITALS — SYSTOLIC BLOOD PRESSURE: 128 MMHG | DIASTOLIC BLOOD PRESSURE: 60 MMHG

## 2020-02-03 LAB
ANION GAP SERPL CALC-SCNC: 12 MMOL/L (ref 7–16)
BUN SERPL-MCNC: 8 MG/DL (ref 7–18)
CALCIUM SERPL-MCNC: 8.3 MG/DL (ref 8.5–10.1)
CHLORIDE SERPL-SCNC: 102 MMOL/L (ref 98–107)
CO2 SERPL-SCNC: 27 MMOL/L (ref 21–32)
CREAT SERPL-MCNC: 1.1 MG/DL (ref 0.6–1.3)
EST. AVERAGE GLUCOSE BLD GHB EST-MCNC: 126 MG/DL
GLUCOSE SERPL-MCNC: 113 MG/DL (ref 70–99)
GLYCOHEMOGLOBIN (HGB A1C): 6 % (ref 4.8–5.6)
POTASSIUM SERPL-SCNC: 3.7 MMOL/L (ref 3.5–5.1)
SODIUM SERPL-SCNC: 141 MMOL/L (ref 136–145)

## 2020-02-03 PROCEDURE — B215YZZ FLUOROSCOPY OF LEFT HEART USING OTHER CONTRAST: ICD-10-PCS | Performed by: INTERNAL MEDICINE

## 2020-02-03 PROCEDURE — B211YZZ FLUOROSCOPY OF MULTIPLE CORONARY ARTERIES USING OTHER CONTRAST: ICD-10-PCS | Performed by: INTERNAL MEDICINE

## 2020-02-03 PROCEDURE — 027034Z DILATION OF CORONARY ARTERY, ONE ARTERY WITH DRUG-ELUTING INTRALUMINAL DEVICE, PERCUTANEOUS APPROACH: ICD-10-PCS | Performed by: INTERNAL MEDICINE

## 2020-02-03 PROCEDURE — 4A023N7 MEASUREMENT OF CARDIAC SAMPLING AND PRESSURE, LEFT HEART, PERCUTANEOUS APPROACH: ICD-10-PCS | Performed by: INTERNAL MEDICINE

## 2020-02-03 NOTE — CARD
[address]  [address]                    CARDIAC CATH REPORT           
_______________________________________________________________________________
 
Name:       MATT MONTERROSO           Room:           [address] [place]  
[facility]#:  [id number]      Account #:      [id number]  
Admission:  02/01/20     Attend Phys:    Mary Michael
Discharge:               Date of Birth:  [birth year]  
         Report #: [id number]
                                                                     [id number]
_______________________________________________________________________________
THIS REPORT FOR:  //name//                      
 
cc:  Johnnie García MD, David L. MD                                                  ~
THIS REPORT FOR:  //name//                      
 
 
--------------- APPROVED REPORT --------------
 
 
Study performed:  02/03/2020 13:32:39
 
Patient Details
Patient Status: In-Patient                  Room #: 203
The patient is a 79 year-old male
 
Event Personnel
Johnnie Bojorquez  Cardiologist, Celeste Lizarraga RN RN, Sanjuanita Joy RTR 
Scrub, Miles Steel RTR Monitor
 
Procedures Performed
Left Heart Cath w/or w/o Coronaries [id number] [facility] w/wo Plasty 
Single RCA [id number] Hemostasis with Hemoband
 
Indication
Non-STEMI , Syncope
 
Risk Factors
Hypertension
 
Admission/Lab Medications/Medications given during procedure
Aspirin, Glycoprotein IllbIlla Inhibitors, Heparin Unfract., 
Midazolam (Versed) IV 2 mg, Lidocaine Subcut 4 ml, Nitroglycerin IA 
600 mcg, Verapamil IA 5 mg, Heparin  units, Heparin IV 7000 
units, Aggrastat Unknown 10 ml, Plavix  mg
 
Procedure Narrative
The patient was brought electively to the Cardiac Catheterization 
Laboratory and was prepped and draped in a sterile manner. The right 
wrist was infiltrated with 2% Lidocaine subcutaneous anesthesia. A 
Slender Glidesheath sheath was inserted into the right radial artery. 
Coronary angiography was performed using coronary diagnostic 
catheters. The right coronary system was accessed and visualized with 
a Diagnostic JR4 6Fr catheter. The left coronary system was accessed 
and visualized with a Diagnostic JL4 6Fr catheter. The left ventricle 
was accessed and visualized with a Diagnostic Pigtail 6Fr catheter. 
 
 
 
[place] [address]                    CARDIAC CATH REPORT           
_______________________________________________________________________________
 
Name:       MATT MONTERROSO           Room:           [address] [place]  
[facility]#:  [id number]      Account #:      [id number]  
Admission:  02/01/20     Attend Phys:    Mary Michael
Discharge:               Date of Birth:  [birth year]  
         Report #: [id number]
                                                                     [id number]
_______________________________________________________________________________
Left ventricular/Aortic Valve gradient assessed via catheter 
pullback. Left ventriculogram was performed in YA projection. 
Closure device was deployed with a 6 Fr vascband. The patient 
tolerated the procedure well and there were no complications 
associated with the procedure. There was no hematoma.
 
Intraoperative Conscious Sedation
Sedation start time:  1444           Case end Time:  
1535    
      Versed  2 mg  
 
Fluoro Time:    10.8 minutes     
Dose:     DAP [id number] cGycm2  2030.03 mGy  
Contrast Type and Amount:  Visipaque 200 ml    
 
Coronary Angiography
The patient's coronary anatomy is right dominant. 
 
Diagnostic Cath
Left Main 0% stenosis
LAD  mid 50% stenosis
Circumflex 30% proximal stenosis
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 
collaterals from the left coronary artery,  50% distal stenosis 
noted
 
Left Ventriculography
The left ventricular ejection fraction is estimated to be 55-60%. 
Left ventricular wall motion abnormalities are not present. There is 
1+ mitral insufficiency.
 
Hemodynamics
The aortic pressure is 118/58 mmHg with a mean of 37 mmHg. The left 
ventricular pressure is 114/10 mmHg with a mean of mmHg. The left 
ventricular end diastolic pressure is 15 mmHg. There was no gradient 
across the aortic valve upon pullback. Pullback from the left 
ventricle to the aorta revealed no gradient across the aortic 
valve.
 
PCI Technique Lesion
Anticoagulation was achieved with Heparin. bolus of iv aggrastat 
given Percutaneous coronary intervention was performed on the 
proximal right coronary artery. The lesion stenosis prior to 
intervention was 100% with ANGELA 0 flow. A 6FR JCR 4 100CM Guide 
Catheter was used to engage the Right ostium. A 0.014 choice PT extra 
support Interventional Guidewire was used to cross the 
 
 
 
[place] [address]                    CARDIAC CATH REPORT           
_______________________________________________________________________________
 
Name:       MATT MONTERROSO           Room:           [address] [place]  
.R.#:  [id number]      Account #:      [id number]  
Admission:  02/01/20     Attend Phys:    Mary Michael
Discharge:               Date of Birth:  [birth year]  
         Report #: [id number]
                                                                     [id number]
_______________________________________________________________________________
lesion.
 
BALLOON DILATION
A Balloon catheter Trek RX 2.5 X 8 was inserted and inflated up to 
14.00atm for 13seconds. Repeat angiography revealed the following 
post-dilatation results: 70 % stenosis. Additional Inflation: 
14.00atm for 11seconds. Additional Inflation: 14.00atm for 14seconds. 
Unable to cross lesion with a BMW nor miracle bro wire.
 
STENT DEPLOYMENT
A drug-eluting stent Ashkan RX Stent  2.5X38mm was inserted and 
inflated up to 11.00atm for 13seconds. Repeat angiography revealed 
the following post-stent deployment results: 0% stenosis. Additional 
Inflation: 13.00atm for 16seconds. Additional Inflation: 16.00atm for 
19seconds.
 
Final angiography reveals 0 % stenosis with ANGELA 3 
flow.
 
Conclusion
1.  complete occlusion of the proximal rca that filled retrograde by 
collaterals
2.  successful placement of a long drug eluting stent in the rca
3.  LVEF 55-60% 
 
Recommendations
Cardiac Rehabilitation Referral
Aggressive Medical Therapy
 
Medications Administered
Clopidogrel
 
 
 
 
 
 
 
 
 
 
 
 
 
<ELECTRONICALLY SIGNED>
                                        By:  Johnnie Bojorquez MD, [facility]      
02/03/20 1852
D: 02/03/20 1852_______________________________________
T: 02/03/20 1852Dsatnam Bojorquez MD, FAC         /INF

## 2020-02-03 NOTE — NUR
CHANGE OF SHIFT, BEDSIDE REPORT GIVEN
PATIENT SEEN AT BEDSIDE, IN BED RESTING AND WATCHING TV
ASSUMED PATIENT CARE

## 2020-02-03 NOTE — NUR
Pt is A&O. Resides at home with his wife. Independent. Pt has a walker, wc and
scooter at home that he can use for mobility. Pt states that he only uses the
scooter when he goes to bed, otherwise has been independent. Hx of HH. No hx
of SNF. Hx of acute rehab at [facility]. Goal is home at [place], no needs
anticipated.

## 2020-02-03 NOTE — NUR
ASSUMED CARE OF PT AFTER REPORT AT 1930. PT A&OX4. VSS. PHYSICAL ASSESSMENT
COMPLETED AND CHARTED. PT ON RA. PT TRACING SR/APACED ON TELE. PT UPADLIB. PT
DENIES ANY PAIN OR DISCOMFORT. PT INSTRUCTED NPO POST MIDNIGHT FOR CARDIAC
CATH TODAY. COMMUNICATES UNDERSTANDING. PT ABLE TO SLEEP WELL ON BED. CALL
LIGHT WITHIN REACH.

## 2020-02-04 VITALS — DIASTOLIC BLOOD PRESSURE: 83 MMHG | SYSTOLIC BLOOD PRESSURE: 137 MMHG

## 2020-02-04 VITALS — DIASTOLIC BLOOD PRESSURE: 77 MMHG | SYSTOLIC BLOOD PRESSURE: 140 MMHG

## 2020-02-04 VITALS — SYSTOLIC BLOOD PRESSURE: 136 MMHG | DIASTOLIC BLOOD PRESSURE: 75 MMHG

## 2020-02-04 VITALS — SYSTOLIC BLOOD PRESSURE: 140 MMHG | DIASTOLIC BLOOD PRESSURE: 77 MMHG

## 2020-02-04 VITALS — DIASTOLIC BLOOD PRESSURE: 81 MMHG | SYSTOLIC BLOOD PRESSURE: 128 MMHG

## 2020-02-04 LAB
ANION GAP SERPL CALC-SCNC: 13 MMOL/L (ref 7–16)
BUN SERPL-MCNC: 8 MG/DL (ref 7–18)
CALCIUM SERPL-MCNC: 8.2 MG/DL (ref 8.5–10.1)
CHLORIDE SERPL-SCNC: 102 MMOL/L (ref 98–107)
CO2 SERPL-SCNC: 24 MMOL/L (ref 21–32)
CREAT SERPL-MCNC: 0.9 MG/DL (ref 0.6–1.3)
GLUCOSE SERPL-MCNC: 105 MG/DL (ref 70–99)
HCT VFR BLD CALC: 38.6 % (ref 42–52)
HGB BLD-MCNC: 13.5 GM/DL (ref 14–18)
MCH RBC QN AUTO: 32 PG (ref 26–34)
MCHC RBC AUTO-ENTMCNC: 34.9 G/DL (ref 28–37)
MCV RBC: 91.6 FL (ref 80–100)
MPV: 9.4 FL. (ref 7.2–11.1)
PLATELET COUNT*: 300 THOU/UL (ref 150–400)
POTASSIUM SERPL-SCNC: 3.6 MMOL/L (ref 3.5–5.1)
RBC # BLD AUTO: 4.22 MIL/UL (ref 4.5–6)
RDW-CV: 14.1 % (ref 10.5–14.5)
SODIUM SERPL-SCNC: 139 MMOL/L (ref 136–145)
TROPONIN-I LEVEL: 1.86 NG/ML (ref ?–0.06)
WBC # BLD AUTO: 8.1 THOU/UL (ref 4–11)
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
## 2020-02-04 NOTE — NUR
ASSUMED CARE OF PT AFTER REPORT AT 1930. PT A&OX4. VSS. PHYSICAL ASSESSMENT
COMPLETED AND CHARTED. PT ON RA. PT TRACING APACED ON TELE. PT UPADLIB.
POST CATH SITE TO RIGHT RADIAL DRESSING CLEAN, DRY & INTACT. NO BLEEDING OR
HEMATOMA NOTED. PT COMPLAINED OF HEADACHE-DR MEJIA MADE AWARE WITH NEW
ORDER. PT ABLE TO SLEEP WELL ON BED. SEIZURE PRECUATION IN PLACE. CALL LIGHT
WITHIN REACH.

## 2020-07-13 ENCOUNTER — HOSPITAL ENCOUNTER (OUTPATIENT)
Dept: HOSPITAL 96 - M.RAD | Age: 80
End: 2020-07-13
Attending: INTERNAL MEDICINE
Payer: COMMERCIAL

## 2020-07-13 DIAGNOSIS — Z79.899: ICD-10-CM

## 2020-07-13 DIAGNOSIS — I51.7: Primary | ICD-10-CM

## 2021-01-06 ENCOUNTER — HOSPITAL ENCOUNTER (OUTPATIENT)
Dept: HOSPITAL 96 - M.RAD | Age: 81
End: 2021-01-06
Attending: INTERNAL MEDICINE
Payer: COMMERCIAL

## 2021-01-06 DIAGNOSIS — Z79.899: ICD-10-CM

## 2021-01-06 DIAGNOSIS — I49.5: ICD-10-CM

## 2021-01-06 DIAGNOSIS — I48.91: Primary | ICD-10-CM

## 2021-01-06 DIAGNOSIS — I42.9: ICD-10-CM
